# Patient Record
Sex: FEMALE | Race: WHITE | Employment: UNEMPLOYED | URBAN - NONMETROPOLITAN AREA
[De-identification: names, ages, dates, MRNs, and addresses within clinical notes are randomized per-mention and may not be internally consistent; named-entity substitution may affect disease eponyms.]

---

## 2020-02-15 ENCOUNTER — HOSPITAL ENCOUNTER (EMERGENCY)
Age: 20
Discharge: HOME | End: 2020-02-15
Attending: FAMILY MEDICINE
Payer: COMMERCIAL

## 2020-02-15 VITALS
HEIGHT: 66 IN | SYSTOLIC BLOOD PRESSURE: 121 MMHG | BODY MASS INDEX: 18.96 KG/M2 | DIASTOLIC BLOOD PRESSURE: 79 MMHG | OXYGEN SATURATION: 100 % | HEART RATE: 112 BPM | WEIGHT: 118 LBS | TEMPERATURE: 98.4 F | RESPIRATION RATE: 18 BRPM

## 2020-02-15 PROCEDURE — 6370000000 HC RX 637 (ALT 250 FOR IP): Performed by: FAMILY MEDICINE

## 2020-02-15 PROCEDURE — 99282 EMERGENCY DEPT VISIT SF MDM: CPT

## 2020-02-15 PROCEDURE — 6360000002 HC RX W HCPCS: Performed by: FAMILY MEDICINE

## 2020-02-15 RX ORDER — AMOXICILLIN 500 MG/1
1000 CAPSULE ORAL DAILY
Qty: 18 CAPSULE | Refills: 0 | Status: SHIPPED | OUTPATIENT
Start: 2020-02-15 | End: 2020-02-24

## 2020-02-15 RX ORDER — AMOXICILLIN 500 MG/1
1000 CAPSULE ORAL ONCE
Status: COMPLETED | OUTPATIENT
Start: 2020-02-15 | End: 2020-02-15

## 2020-02-15 RX ORDER — DEXAMETHASONE SODIUM PHOSPHATE 10 MG/ML
10 INJECTION, SOLUTION INTRAMUSCULAR; INTRAVENOUS ONCE
Status: COMPLETED | OUTPATIENT
Start: 2020-02-15 | End: 2020-02-15

## 2020-02-15 RX ADMIN — AMOXICILLIN 1000 MG: 500 CAPSULE ORAL at 03:33

## 2020-02-15 RX ADMIN — DEXAMETHASONE SODIUM PHOSPHATE 10 MG: 10 INJECTION, SOLUTION INTRAMUSCULAR; INTRAVENOUS at 03:33

## 2020-02-15 ASSESSMENT — PAIN DESCRIPTION - PAIN TYPE: TYPE: ACUTE PAIN

## 2020-02-15 ASSESSMENT — PAIN DESCRIPTION - LOCATION: LOCATION: THROAT

## 2020-02-15 ASSESSMENT — ENCOUNTER SYMPTOMS: SORE THROAT: 1

## 2020-02-15 NOTE — ED PROVIDER NOTES
[facility]  eMERGENCY dEPARTMENT eNCOUnter          Layla Faustin       Chief Complaint   Patient presents with    Pharyngitis     Sore throat past 3-4 days       Nurses Notes reviewed and I agree except as noted in the HPI. HISTORY OF PRESENT ILLNESS    Yojana Escalante is a 23 y.o. female who presents to the emergency room via private vehicle with friend, patient complaining of sore throat, states \"my throat hurts very bad\", onset over the past 4 days, patient denies fever denies rhinorrhea, states rare cough, does state history of sick contact with a brother all over this was 1.5 weeks prior with flulike symptoms. Patient states are her PCP 2 days prior, did have a throat swab but that was negative though states it was sent to hospital for further testing. Patient does not quantify her throat pain. REVIEW OF SYSTEMS     Review of Systems   Constitutional: Negative for fever. HENT: Positive for sore throat. PAST MEDICAL HISTORY    has no past medical history on file. SURGICAL HISTORY      has no past surgical history on file. CURRENT MEDICATIONS       Discharge Medication List as of 2/15/2020  3:47 AM      CONTINUE these medications which have NOT CHANGED    Details   NONFORMULARY daily Patient takes birth control daily but can not remember nameHistorical Med             ALLERGIES     has No Known Allergies. FAMILY HISTORY     has no family status information on file. family history is not on file. SOCIAL HISTORY      reports that she has never smoked. She has never used smokeless tobacco.    PHYSICAL EXAM     INITIAL VITALS:  height is 5' 6\" (1.676 m) and weight is 118 lb (53.5 kg). Her oral temperature is 98.4 °F (36.9 °C). Her blood pressure is 121/79 and her pulse is 112. Her respiration is 18 and oxygen saturation is 100%. Physical Exam   Constitutional: Patient is oriented to person, place, and time.  Patient appears well-developed and well-nourished. Patient is active and cooperative. HENT:   Head: Normocephalic and atraumatic. Head is without contusion. Right Ear: Hearing and external ear normal. No drainage. Clear TM  Left Ear: Hearing and external ear normal. No drainage. Clear TM  Nose: Nose normal. No nasal deformity. No epistaxis. Mouth/Throat: Mucous membranes are not dry. Tonsillar exudate greater on the right, mild erythema  Eyes: EOMI. Conjunctivae, sclera, and lids are normal. Right eye exhibits no discharge. Left eye exhibits no discharge. Neck: Full passive range of motion without pain and phonation normal.   Cardiovascular:  Normal rate, regular rhythm and intact distal pulses. No edema. Pulses: Right radial pulse  2+   Pulmonary/Chest: Effort normal. No tachypnea and no bradypnea. No wheezes, rhonchi, or rales. Abdominal: Soft. Patient without distension   Musculoskeletal:   Negative acute trauma or deformity,  apparent full range of motion and normal strength all extremities appropriate to age. Neurological: Patient is alert and oriented to person, place, and time. patient displays no tremor. Patient displays no seizure activity. .  Lymphatic: Right anterior cervical lymphadenopathy greater than mild left anterior, negative posterior cervical or auricular lymphadenopathy  Skin: Skin is warm and dry. Patient is not diaphoretic. Psychiatric: Patient has a normal mood and affect.  Patient speech is normal and behavior is normal. Cognition and memory are normal.    DIFFERENTIAL DIAGNOSIS:   ST, viral pharyngitis, URI    DIAGNOSTIC RESULTS           RADIOLOGY: non-plain film images(s) such as CT, Ultrasound and MRI are read by the radiologist.  No orders to display       LABS:   Labs Reviewed - No data to display    EMERGENCY DEPARTMENT COURSE:   Vitals:    Vitals:    02/15/20 0319   BP: 121/79   Pulse: 112   Resp: 18   Temp: 98.4 °F (36.9 °C)   TempSrc: Oral   SpO2: 100%   Weight: 118 lb (53.5 kg)   Height: 5' 6\" (1.676 m)     Patient history and physical exam taken at bedside, discussed patient symptoms and exam findings, discussed clinical diagnosis strep throat based on [facility] criteria, confirmed allergies, will give first dose amoxicillin in the emergency room, will give oral dexamethasone for symptom control, will prescribe amoxicillin same, discussed appropriate use of Motrin/Tylenol, prevention of spread to others, follow-up with primary care, patient acknowledges    FINAL IMPRESSION      1. Streptococcal sore throat          DISPOSITION/PLAN   Discharge    PATIENT REFERRED TO:  [address] [place] [address]. [address] [place] [id number]    Call         DISCHARGE MEDICATIONS:  Discharge Medication List as of 2/15/2020  3:47 AM      START taking these medications    Details   amoxicillin (AMOXIL) 500 MG capsule Take 2 capsules by mouth daily for 9 days First home dose 2/16/2020, Disp-18 capsule, R-0Print                 Summation      Patient Course: Discharge    ED Medications administered this visit:    Medications   dexamethasone (PF) (DECADRON) injection 10 mg (10 mg Oral Given 2/15/20 0333)   amoxicillin (AMOXIL) capsule 1,000 mg (1,000 mg Oral Given 2/15/20 3721)       New Prescriptions from this visit:    Discharge Medication List as of 2/15/2020  3:47 AM      START taking these medications    Details   amoxicillin (AMOXIL) 500 MG capsule Take 2 capsules by mouth daily for 9 days First home dose 2/16/2020, Disp-18 capsule, R-0Print             Follow-up:  [address] [place] [address]. [address] [place] [id number]    Call           Final Impression:   1.  Streptococcal sore throat               (Please note that portions of this note were completed with a voice recognition program.  Efforts were made to edit the dictations but occasionally words are mis-transcribed.)    MD Abelardo Appiah MD  02/15/20 1016

## 2022-01-25 ENCOUNTER — HOSPITAL ENCOUNTER (OUTPATIENT)
Dept: ULTRASOUND IMAGING | Age: 22
Discharge: HOME | End: 2022-01-27
Payer: COMMERCIAL

## 2022-01-25 DIAGNOSIS — R19.00 ABDOMINAL SWELLING: ICD-10-CM

## 2022-01-25 PROCEDURE — 76856 US EXAM PELVIC COMPLETE: CPT
